# Patient Record
Sex: FEMALE | Race: WHITE | NOT HISPANIC OR LATINO | Employment: UNEMPLOYED | URBAN - METROPOLITAN AREA
[De-identification: names, ages, dates, MRNs, and addresses within clinical notes are randomized per-mention and may not be internally consistent; named-entity substitution may affect disease eponyms.]

---

## 2017-09-19 ENCOUNTER — ALLSCRIPTS OFFICE VISIT (OUTPATIENT)
Dept: OTHER | Facility: OTHER | Age: 37
End: 2017-09-19

## 2017-09-19 DIAGNOSIS — M25.50 PAIN IN JOINT: ICD-10-CM

## 2017-09-19 DIAGNOSIS — R53.81 OTHER MALAISE: ICD-10-CM

## 2017-09-19 DIAGNOSIS — Z13.6 ENCOUNTER FOR SCREENING FOR CARDIOVASCULAR DISORDERS: ICD-10-CM

## 2017-09-19 DIAGNOSIS — R53.83 OTHER FATIGUE: ICD-10-CM

## 2017-09-28 ENCOUNTER — LAB CONVERSION - ENCOUNTER (OUTPATIENT)
Dept: OTHER | Facility: OTHER | Age: 37
End: 2017-09-28

## 2017-09-28 ENCOUNTER — GENERIC CONVERSION - ENCOUNTER (OUTPATIENT)
Dept: OTHER | Facility: OTHER | Age: 37
End: 2017-09-28

## 2017-09-28 LAB
A/G RATIO (HISTORICAL): 1.8 (CALC) (ref 1–2.5)
ALBUMIN SERPL BCP-MCNC: 4.7 G/DL (ref 3.6–5.1)
ALP SERPL-CCNC: 39 U/L (ref 33–115)
ALT SERPL W P-5'-P-CCNC: 13 U/L (ref 6–29)
AST SERPL W P-5'-P-CCNC: 14 U/L (ref 10–30)
BASOPHILS # BLD AUTO: 0.8 %
BASOPHILS # BLD AUTO: 50 CELLS/UL (ref 0–200)
BILIRUB SERPL-MCNC: 0.6 MG/DL (ref 0.2–1.2)
BUN SERPL-MCNC: 10 MG/DL (ref 7–25)
BUN/CREA RATIO (HISTORICAL): NORMAL (CALC) (ref 6–22)
CALCIUM SERPL-MCNC: 9.6 MG/DL (ref 8.6–10.2)
CHLORIDE SERPL-SCNC: 106 MMOL/L (ref 98–110)
CHOLEST SERPL-MCNC: 213 MG/DL
CHOLEST/HDLC SERPL: 3.9 (CALC)
CO2 SERPL-SCNC: 29 MMOL/L (ref 20–31)
CREAT SERPL-MCNC: 0.85 MG/DL (ref 0.5–1.1)
DEPRECATED RDW RBC AUTO: 12.6 % (ref 11–15)
EGFR AFRICAN AMERICAN (HISTORICAL): 101 ML/MIN/1.73M2
EGFR-AMERICAN CALC (HISTORICAL): 88 ML/MIN/1.73M2
EOSINOPHIL # BLD AUTO: 233 CELLS/UL (ref 15–500)
EOSINOPHIL # BLD AUTO: 3.7 %
ERYTHROCYTE SEDIMENTATION RATE (HISTORICAL): 6 MM/H
GAMMA GLOBULIN (HISTORICAL): 2.6 G/DL (CALC) (ref 1.9–3.7)
GLUCOSE (HISTORICAL): 86 MG/DL (ref 65–99)
HCT VFR BLD AUTO: 41.9 % (ref 35–45)
HDLC SERPL-MCNC: 55 MG/DL
HGB BLD-MCNC: 14.2 G/DL (ref 11.7–15.5)
LDL CHOLESTEROL (HISTORICAL): 132 MG/DL (CALC)
LYME IGG/IGM AB (HISTORICAL): <0.9 INDEX
LYMPHOCYTES # BLD AUTO: 1852 CELLS/UL (ref 850–3900)
LYMPHOCYTES # BLD AUTO: 29.4 %
MCH RBC QN AUTO: 31.8 PG (ref 27–33)
MCHC RBC AUTO-ENTMCNC: 33.9 G/DL (ref 32–36)
MCV RBC AUTO: 93.7 FL (ref 80–100)
MONOCYTES # BLD AUTO: 365 CELLS/UL (ref 200–950)
MONOCYTES (HISTORICAL): 5.8 %
NEUTROPHILS # BLD AUTO: 3799 CELLS/UL (ref 1500–7800)
NEUTROPHILS # BLD AUTO: 60.3 %
NON-HDL-CHOL (CHOL-HDL) (HISTORICAL): 158 MG/DL (CALC)
PLATELET # BLD AUTO: 358 THOUSAND/UL (ref 140–400)
PMV BLD AUTO: 9.3 FL (ref 7.5–12.5)
POTASSIUM SERPL-SCNC: 4.5 MMOL/L (ref 3.5–5.3)
RBC # BLD AUTO: 4.47 MILLION/UL (ref 3.8–5.1)
SODIUM SERPL-SCNC: 141 MMOL/L (ref 135–146)
TOTAL PROTEIN (HISTORICAL): 7.3 G/DL (ref 6.1–8.1)
TRIGL SERPL-MCNC: 143 MG/DL
TSH SERPL DL<=0.05 MIU/L-ACNC: 1 MIU/L
WBC # BLD AUTO: 6.3 THOUSAND/UL (ref 3.8–10.8)

## 2018-01-17 NOTE — PROGRESS NOTES
Assessment    1  Encounter for preventive health examination (V70 0) (Z00 00)   2  Arthralgia of multiple joints (719 49) (M25 50)   3  Malaise and fatigue (780 79) (R53 81,R53 83)    Plan  Arthralgia of multiple joints, Malaise and fatigue    · (1) CBC/PLT/DIFF; Status:Active; Requested for:[id number];    · (1) COMPREHENSIVE METABOLIC PANEL; Status:Active; Requested for:[id number];    · (1) LYME ANTIBODY PROFILE W/REFLEX TO WESTERN BLOT; Status:Active; Requested for:[id number];    · (1) SED RATE; Status:Active; Requested for:[id number];    · (1) TSH; Status:Active; Requested ITE:[id number]; Health Maintenance    · Fluzone Quadrivalent 0 5 ML Intramuscular Suspension Prefilled Syringe  Screening for cardiovascular condition    · (1) LIPID PANEL, FASTING; Status:Active; Requested for:[id number];     Discussion/Summary  health maintenance visit Currently, she eats a healthy diet  cervical cancer screening is needed every year Breast cancer screening: breast cancer screening is not indicated  Colorectal cancer screening: colorectal cancer screening is not indicated  The risks and benefits of immunizations were discussed  Advice and education were given regarding weight bearing exercise and cardiovascular risk reduction  Patient discussion: discussed with the patient  History of Present Illness  , Adult Female: The patient is being seen for a health maintenance evaluation  General Health: The patient's health since the last visit is described as good  She has regular dental visits  She denies vision problems  She denies hearing loss  Immunizations status: up to date  Lifestyle:  She consumes a diverse and healthy diet  She does not have any weight concerns  She exercises regularly  She does not use tobacco  She denies alcohol use  She denies drug use  Reproductive health: the patient is premenopausal   she reports normal menses  Screening: cancer screening reviewed and current     metabolic screening reviewed and current  risk screening reviewed and current  Review of Systems    Constitutional: feeling tired  Eyes: No complaints of eye pain, no red eyes, no eyesight problems, no discharge, no dry eyes, no itching of eyes  ENT: no complaints of earache, no loss of hearing, no nose bleeds, no nasal discharge, no sore throat, no hoarseness  Cardiovascular: No complaints of slow heart rate, no fast heart rate, no chest pain, no palpitations, no leg claudication, no lower extremity edema  Respiratory: No complaints of shortness of breath, no wheezing, no cough, no SOB on exertion, no orthopnea, no PND  Gastrointestinal: No complaints of abdominal pain, no constipation, no nausea or vomiting, no diarrhea, no bloody stools  Genitourinary: No complaints of dysuria, no incontinence, no pelvic pain, no dysmenorrhea, no vaginal discharge or bleeding  Musculoskeletal: arthralgias  Integumentary: No complaints of skin rash or lesions, no itching, no skin wounds, no breast pain or lump  Neurological: No complaints of headache, no confusion, no convulsions, no numbness, no dizziness or fainting, no tingling, no limb weakness, no difficulty walking  Psychiatric: Not suicidal, no sleep disturbance, no anxiety or depression, no change in personality, no emotional problems  Endocrine: No complaints of proptosis, no hot flashes, no muscle weakness, no deepening of the voice, no feelings of weakness  Hematologic/Lymphatic: No complaints of swollen glands, no swollen glands in the neck, does not bleed easily, does not bruise easily        Family History  Mother    · Denied: Family history of Asthma, acute   · Denied: Family history of epilepsy   · Denied: Family history of kidney disease   · Denied: Family history of mental disorder   · Denied: Family history of osteoporosis   · Denied: Family history of thyroid disease  Father    · Family history of diabetes mellitus (V18 0) (Z83 3)   · Family history of hyperlipidemia (V18 19) (Z83 49)  Grandmother    · Family history of malignant neoplasm (V16 9) (Z80 9)   · Family history of substance abuse (V17 0) (Z81 4)  Uncle    · Family history of cardiac disorder (V17 49) (Z82 49)   · Family history of cerebrovascular accident (CVA) (V17 1) (Z82 3)    Social History    · Always uses seat belt   · Caffeine use (V49 89) (F15 90)   · Exercises sporadically   · Former smoker (E67 04) (Z87 891)   · [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   · Lives with family   · No illicit drug use   · Social alcohol use (Z78 9)    Current Meds   1  [facility] Vitamin D3 1000 UNIT CAPS; Therapy: (Recorded:19Sep2017) to Recorded   2  Vitamin B-12 500 MCG Oral Tablet; Therapy: (Recorded:19Sep2017) to Recorded   3  Vitamin C TABS; Therapy: (Recorded:19Sep2017) to Recorded    Allergies    1  Penicillins    Vitals   Recorded: 19Sep2017 10:39AM   Heart Rate 67   Respiration 15   Systolic 181   Diastolic 74   Height 5 ft 3 in   Weight 139 lb 0 6 oz   BMI Calculated 24 63   BSA Calculated 1 66   O2 Saturation 98     Physical Exam    Constitutional   General appearance: No acute distress, well appearing and well nourished  Eyes   Conjunctiva and lids: No swelling, erythema or discharge  Pupils and irises: Equal, round, reactive to light  Ophthalmoscopic examination: Normal fundi and optic discs  Ears, Nose, Mouth, and Throat   External inspection of ears and nose: Normal     Otoscopic examination: Tympanic membranes translucent with normal light reflex  Canals patent without erythema  Hearing: Normal     Nasal mucosa, septum, and turbinates: Normal without edema or erythema  Lips, teeth, and gums: Normal, good dentition  Oropharynx: Normal with no erythema, edema, exudate or lesions  Neck   Neck: Supple, symmetric, trachea midline, no masses  Thyroid: Normal, no thyromegaly  Pulmonary   Respiratory effort: No increased work of breathing or signs of respiratory distress      Percussion of chest: Normal     Palpation of chest: Normal     Auscultation of lungs: Clear to auscultation  Cardiovascular   Palpation of heart: Normal PMI, no thrills  Auscultation of heart: Normal rate and rhythm, normal S1 and S2, no murmurs  Carotid pulses: 2+ bilaterally  Abdominal aorta: Normal     Femoral pulses: 2+ bilaterally  Pedal pulses: 2+ bilaterally  Examination of extremities for edema and/or varicosities: Normal     Chest   Breasts: Normal, no dimpling or skin changes appreciated  Palpation of breasts and axillae: Normal, no masses palpated  Abdomen   Abdomen: Non-tender, no masses  Liver and spleen: No hepatomegaly or splenomegaly  Examination for hernias: No hernia appreciated  Lymphatic   Palpation of lymph nodes in neck: No lymphadenopathy  Palpation of lymph nodes in axillae: No lymphadenopathy  Palpation of lymph nodes in groin: No lymphadenopathy  Palpation of lymph nodes in other areas: No lymphadenopathy  Musculoskeletal   Gait and station: Normal     Digits and nails: Normal without clubbing or cyanosis  Joints, bones, and muscles: Normal     Range of motion: Normal     Stability: Normal     Muscle strength/tone: Normal     Skin   Skin and subcutaneous tissue: Normal without rashes or lesions  Palpation of skin and subcutaneous tissue: Normal turgor  Neurologic   Cranial nerves: Cranial nerves II-XII intact  Reflexes: 2+ and symmetric  Sensation: No sensory loss  Psychiatric   Judgment and insight: Normal     Orientation to person, place, and time: Normal     Recent and remote memory: Intact  Mood and affect: Normal        Results/Data  PHQ-9 Adult Depression Screening [id number] 10:51AM User, s     Test Name Result Flag Reference   PHQ-9 Adult Depression Score 3     Over the last two weeks, how often have you been bothered by any of the following problems?   Little interest or pleasure in doing things: Not at all - 0  Feeling down, depressed, or hopeless: Not at all - 0  Trouble falling or staying asleep, or sleeping too much: Not at all - 0  Feeling tired or having little energy: More than half the days - 2  Poor appetite or over eating: Not at all - 0  Feeling bad about yourself - or that you are a failure or have let yourself or your family down: Not at all - 0  Trouble concentrating on things, such as reading the newspaper or watching television: Several days - 1  Moving or speaking so slowly that other people could have noticed   Or the opposite -  being so fidgety or restless that you have been moving around a lot more than usual: Not at all - 0  Thoughts that you would be better off dead, or of hurting yourself in some way: Not at all - 0   PHQ-9 Adult Depression Screening Negative     PHQ-9 Difficulty Level Not difficult at all     PHQ-9 Severity Minimal Depression         Signatures   Electronically signed by : Hernando Reese DO; Sep 19 2017 11:12AM EST                       (Author)

## 2018-01-22 VITALS
DIASTOLIC BLOOD PRESSURE: 74 MMHG | OXYGEN SATURATION: 98 % | SYSTOLIC BLOOD PRESSURE: 118 MMHG | HEART RATE: 67 BPM | RESPIRATION RATE: 15 BRPM | BODY MASS INDEX: 24.64 KG/M2 | HEIGHT: 63 IN | WEIGHT: 139.04 LBS

## 2020-02-04 ENCOUNTER — OFFICE VISIT (OUTPATIENT)
Dept: URGENT CARE | Facility: CLINIC | Age: 40
End: 2020-02-04
Payer: COMMERCIAL

## 2020-02-04 VITALS
SYSTOLIC BLOOD PRESSURE: 106 MMHG | HEART RATE: 97 BPM | RESPIRATION RATE: 16 BRPM | TEMPERATURE: 102.2 F | WEIGHT: 132 LBS | BODY MASS INDEX: 23.38 KG/M2 | OXYGEN SATURATION: 100 % | DIASTOLIC BLOOD PRESSURE: 64 MMHG

## 2020-02-04 DIAGNOSIS — J02.9 SORE THROAT: Primary | ICD-10-CM

## 2020-02-04 LAB — S PYO AG THROAT QL: NEGATIVE

## 2020-02-04 PROCEDURE — 87880 STREP A ASSAY W/OPTIC: CPT | Performed by: PHYSICIAN ASSISTANT

## 2020-02-04 PROCEDURE — 87070 CULTURE OTHR SPECIMN AEROBIC: CPT | Performed by: PHYSICIAN ASSISTANT

## 2020-02-04 PROCEDURE — 99213 OFFICE O/P EST LOW 20 MIN: CPT | Performed by: PHYSICIAN ASSISTANT

## 2020-02-04 RX ORDER — AZITHROMYCIN 250 MG/1
TABLET, FILM COATED ORAL
Qty: 6 TABLET | Refills: 0 | Status: SHIPPED | OUTPATIENT
Start: 2020-02-04 | End: 2020-02-08

## 2020-02-04 RX ORDER — CHOLECALCIFEROL (VITAMIN D3) 125 MCG
CAPSULE ORAL
COMMUNITY

## 2020-02-04 NOTE — PATIENT INSTRUCTIONS
Acute Pharyngitis:   -Rapid strep was negative, will send out for culture  Will treat empirically with Azithromycin 250mg taken as prescribed as there is tonsillar exudates, cervical adenopathy and tonsillar erythema/edema  Take with food and a probiotic    -Warm salt water gargles and tea with honey may provide relief  Stay well hydrated and rest    -You can take Cepacol throat drops or spray for local pain relief of the throat   You can take Advil or Tylenol for fever or pain    -If your symptoms worsen or change follow up with your PCP for a recheck

## 2020-02-04 NOTE — PROGRESS NOTES
[facility]        NAME: Erum Child is a 36 y o  female  : 1980    MRN: [id number]  DATE: 2020  TIME: 3:17 PM    Assessment and Plan   Sore throat [J02 9]  1  Sore throat  POCT rapid strepA    Throat culture    azithromycin (ZITHROMAX) 250 mg tablet         Patient Instructions   Acute Pharyngitis:   -Rapid strep was negative, will send out for culture  Will treat empirically with Azithromycin 250mg taken as prescribed as there is tonsillar exudates, cervical adenopathy and tonsillar erythema/edema  Take with food and a probiotic    -Warm salt water gargles and tea with honey may provide relief  Stay well hydrated and rest    -You can take Cepacol throat drops or spray for local pain relief of the throat  You can take Advil or Tylenol for fever or pain    -If your symptoms worsen or change follow up with your PCP for a recheck     Follow up with PCP in 3-5 days  Proceed to  ER if symptoms worsen  Chief Complaint     Chief Complaint   Patient presents with    Cold Like Symptoms     pt presnets with chills, bodyaches, right sided throat pain, right ear pain; started yesterday         History of Present Illness       The patient presents today for a one day hx of fever, chills, myalgias, pharyngitis and right sided otalgia  The patient states that she has painful swallowing and anorexia  She denies sick contacts or recent travel  She denies drooling, neck pain  She denies dizziness or weakness  No hearing loss or otorrhea  She denies recent travel  No OTC measures attempted  Review of Systems   Review of Systems   Constitutional: Positive for chills and fever  Negative for activity change, appetite change, diaphoresis and fatigue  HENT: Positive for ear pain, sore throat and trouble swallowing  Negative for congestion, ear discharge, facial swelling, hearing loss, postnasal drip, rhinorrhea, sinus pressure, sinus pain, sneezing, tinnitus and voice change  Respiratory: Negative for cough, chest tightness, shortness of breath, wheezing and stridor  Cardiovascular: Negative for chest pain, palpitations and leg swelling  Gastrointestinal: Negative for abdominal pain, diarrhea, nausea and vomiting  Musculoskeletal: Negative for arthralgias, joint swelling, myalgias, neck pain and neck stiffness  Skin: Negative for rash  Allergic/Immunologic: Negative for immunocompromised state  Neurological: Negative for dizziness, weakness, light-headedness, numbness and headaches  Hematological: Negative for adenopathy  Current Medications       Current Outpatient Medications:     Cholecalciferol 25 MCG (1000 UT) capsule, Take by mouth, Disp: , Rfl:     vitamin B-12 (VITAMIN B-12) 500 mcg tablet, Take by mouth, Disp: , Rfl:     azithromycin (ZITHROMAX) 250 mg tablet, Take 2 tablets today then 1 tablet daily x 4 days, Disp: 6 tablet, Rfl: 0    Current Allergies     Allergies as of 02/04/2020 - Reviewed 02/04/2020   Allergen Reaction Noted    Penicillins Hives and Rash 04/15/2016            The following portions of the patient's history were reviewed and updated as appropriate: allergies, current medications, past family history, past medical history, past social history, past surgical history and problem list      Past Medical History:   Diagnosis Date    Patient denies medical problems        Past Surgical History:   Procedure Laterality Date    NO PAST SURGERIES         Family History   Problem Relation Age of Onset    No Known Problems Mother     Diabetes Father     Hypertension Father     Hyperlipidemia Father          Medications have been verified  Objective   /64   Pulse 97   Temp (!) 102 2 °F (39 °C)   Resp 16   Wt 59 9 kg (132 lb)   LMP 01/17/2020   SpO2 100%   BMI 23 38 kg/m²        Physical Exam     Physical Exam   Constitutional: She is oriented to person, place, and time  She appears well-developed and well-nourished  Non-toxic appearance  She does not have a sickly appearance  She does not appear ill  No distress  HENT:   Head: Normocephalic and atraumatic  Right Ear: Hearing, tympanic membrane, external ear and ear canal normal    Left Ear: Hearing, tympanic membrane, external ear and ear canal normal    Nose: Nose normal  No mucosal edema or rhinorrhea  Right sinus exhibits no maxillary sinus tenderness and no frontal sinus tenderness  Left sinus exhibits no maxillary sinus tenderness and no frontal sinus tenderness  Mouth/Throat: Uvula is midline and mucous membranes are normal  No uvula swelling  Posterior oropharyngeal edema and posterior oropharyngeal erythema present  No oropharyngeal exudate or tonsillar abscesses  Tonsils are 2+ on the right  Tonsils are 2+ on the left  Tonsillar exudate  Neck: Normal range of motion  Neck supple  Cardiovascular: Normal rate, regular rhythm, S1 normal and S2 normal  Exam reveals no gallop, no S3, no S4, no distant heart sounds and no friction rub  No murmur heard  Pulmonary/Chest: No accessory muscle usage  No tachypnea and no bradypnea  No respiratory distress  She has no decreased breath sounds  She has no wheezes  She has no rhonchi  She has no rales  Lymphadenopathy:     She has cervical adenopathy  Neurological: She is alert and oriented to person, place, and time  Skin: She is not diaphoretic  Psychiatric: She has a normal mood and affect  Her behavior is normal    Nursing note and vitals reviewed

## 2020-02-06 LAB — BACTERIA THROAT CULT: NORMAL

## 2021-02-04 ENCOUNTER — OFFICE VISIT (OUTPATIENT)
Dept: FAMILY MEDICINE CLINIC | Facility: CLINIC | Age: 41
End: 2021-02-04
Payer: COMMERCIAL

## 2021-02-04 VITALS
WEIGHT: 135 LBS | HEART RATE: 72 BPM | OXYGEN SATURATION: 99 % | DIASTOLIC BLOOD PRESSURE: 80 MMHG | BODY MASS INDEX: 24.84 KG/M2 | SYSTOLIC BLOOD PRESSURE: 138 MMHG | TEMPERATURE: 98.4 F | RESPIRATION RATE: 16 BRPM | HEIGHT: 62 IN

## 2021-02-04 DIAGNOSIS — Z76.89 ESTABLISHING CARE WITH NEW DOCTOR, ENCOUNTER FOR: Primary | ICD-10-CM

## 2021-02-04 DIAGNOSIS — R53.83 FATIGUE, UNSPECIFIED TYPE: ICD-10-CM

## 2021-02-04 DIAGNOSIS — R59.0 SUPRACLAVICULAR LYMPHADENOPATHY: ICD-10-CM

## 2021-02-04 PROBLEM — M25.50 ARTHRALGIA OF MULTIPLE JOINTS: Status: ACTIVE | Noted: 2017-09-19

## 2021-02-04 PROCEDURE — 99203 OFFICE O/P NEW LOW 30 MIN: CPT | Performed by: FAMILY MEDICINE

## 2021-02-04 NOTE — PATIENT INSTRUCTIONS
HYDRATION  REST  BW WILL BE OBTAINED  CT SOFT TISSUE NECK    SCHEDULE CPX IN NEAR FUTURE    FURTHER PLANS PENDING STUDIES

## 2021-02-05 NOTE — PROGRESS NOTES
Assessment/Plan:    No problem-specific Assessment & Plan notes found for this encounter  Diagnoses and all orders for this visit:    Establishing care with new doctor, encounter for    Supraclavicular lymphadenopathy  -     CBC and differential; Future  -     Sedimentation rate, automated; Future  -     C-reactive protein; Future  -     CT soft tissue neck w contrast; Future  -     CBC and differential  -     Sedimentation rate, automated  -     C-reactive protein    Fatigue, unspecified type  -     CBC and differential; Future  -     Comprehensive metabolic panel; Future  -     Lipid panel; Future  -     TSH, 3rd generation; Future  -     EBV acute panel; Future  -     Lyme Total Antibody Profile with reflex to WB; Future  -     Sedimentation rate, automated; Future  -     C-reactive protein; Future  -     CT soft tissue neck w contrast; Future  -     CBC and differential  -     Comprehensive metabolic panel  -     Lipid panel  -     TSH, 3rd generation  -     EBV acute panel  -     Lyme Total Antibody Profile with reflex to WB  -     Sedimentation rate, automated  -     C-reactive protein          Patient Instructions   HYDRATION  REST  BW WILL BE OBTAINED  CT SOFT TISSUE NECK    SCHEDULE CPX IN NEAR FUTURE    FURTHER PLANS PENDING STUDIES        Return in about 2 years (around 2/4/2023) for Annual physical     Subjective:      Patient ID: Kapil Faith is a 39 y o  female      Chief Complaint   Patient presents with   Abiodun ON LEFT SIDE OF NECK       PATIENT IS A NEW PATIENT TO THE PRACTICE    REVIEWED PAST MEDICAL HISTORY, MEDS, ALLERGIES AND FHX  QUIT SMOKING 4 YRS AGO  1 ETOH WEEKLY    CONCERNS    FATIGUE  NOT HERSELF  NOT MOTIVATED  TIRED A LOT  DENIES ANY JOINT PAIN OR SWELLING  NO RASHES    L NECK SWELLING  TENDER AT TIMES  CLAVICAL DOES NOT FEEL RIGHT      The following portions of the patient's history were reviewed and updated as appropriate: allergies, current medications, past family history, past medical history, past social history, past surgical history and problem list     Review of Systems   Constitutional: Positive for fatigue  Negative for chills and fever  HENT: Negative for congestion, ear discharge, ear pain, mouth sores, postnasal drip, sore throat and trouble swallowing  Eyes: Negative for pain, discharge and visual disturbance  Respiratory: Negative for cough, shortness of breath and wheezing  Cardiovascular: Negative for chest pain, palpitations and leg swelling  Gastrointestinal: Negative for abdominal distention, abdominal pain, blood in stool, diarrhea and nausea  Endocrine: Negative for polydipsia, polyphagia and polyuria  Genitourinary: Negative for dysuria, frequency, hematuria and urgency  Musculoskeletal: Negative for arthralgias, gait problem and joint swelling  Skin: Negative for pallor and rash  Neurological: Negative for dizziness, syncope, speech difficulty, weakness, light-headedness, numbness and headaches  Hematological: Negative for adenopathy  Psychiatric/Behavioral: Negative for behavioral problems, confusion and sleep disturbance  The patient is nervous/anxious  Current Outpatient Medications   Medication Sig Dispense Refill    Cholecalciferol 25 MCG (1000 UT) capsule Take by mouth      vitamin B-12 (VITAMIN B-12) 500 mcg tablet Take by mouth       No current facility-administered medications for this visit  Objective:    /80   Pulse 72   Temp 98 4 °F (36 9 °C) (Temporal)   Resp 16   Ht 5' 2 25" (1 581 m)   Wt 61 2 kg (135 lb)   SpO2 99%   BMI 24 49 kg/m²        Physical Exam  Constitutional:       Appearance: She is well-developed  HENT:      Head: Normocephalic and atraumatic  Eyes:      General:         Right eye: No discharge  Left eye: No discharge  Conjunctiva/sclera: Conjunctivae normal       Pupils: Pupils are equal, round, and reactive to light     Neck: Musculoskeletal: Normal range of motion and neck supple  Thyroid: No thyromegaly  Comments: MILD L SUPRACLAVICULAR FULLNESS    Cardiovascular:      Rate and Rhythm: Normal rate and regular rhythm  Heart sounds: Normal heart sounds  No murmur  Pulmonary:      Effort: Pulmonary effort is normal  No respiratory distress  Breath sounds: Normal breath sounds  No wheezing or rales  Abdominal:      General: Bowel sounds are normal       Palpations: Abdomen is soft  Tenderness: There is no abdominal tenderness  Musculoskeletal: Normal range of motion  General: No tenderness  Lymphadenopathy:      Cervical: Cervical adenopathy present  Skin:     General: Skin is warm and dry  Findings: No erythema or rash  Neurological:      Mental Status: She is alert and oriented to person, place, and time  Psychiatric:         Behavior: Behavior normal          Thought Content:  Thought content normal          Judgment: Judgment normal                 Veronique Whipple MD

## 2021-02-09 ENCOUNTER — TELEPHONE (OUTPATIENT)
Dept: FAMILY MEDICINE CLINIC | Facility: CLINIC | Age: 41
End: 2021-02-09

## 2021-02-09 NOTE — TELEPHONE ENCOUNTER
Dez two message to inform her that her insurance approved her CT Scan    LM with central scheduling telephone number

## 2021-02-15 ENCOUNTER — TRANSCRIBE ORDERS (OUTPATIENT)
Dept: ADMINISTRATIVE | Facility: HOSPITAL | Age: 41
End: 2021-02-15

## 2021-03-31 ENCOUNTER — TELEPHONE (OUTPATIENT)
Dept: FAMILY MEDICINE CLINIC | Facility: CLINIC | Age: 41
End: 2021-03-31

## 2021-04-26 ENCOUNTER — TRANSCRIBE ORDERS (OUTPATIENT)
Dept: ADMINISTRATIVE | Facility: HOSPITAL | Age: 41
End: 2021-04-26

## 2021-04-27 ENCOUNTER — HOSPITAL ENCOUNTER (OUTPATIENT)
Dept: RADIOLOGY | Facility: HOSPITAL | Age: 41
Discharge: HOME | End: 2021-04-27
Payer: COMMERCIAL

## 2021-04-27 DIAGNOSIS — R59.0 SUPRACLAVICULAR LYMPHADENOPATHY: ICD-10-CM

## 2021-04-27 DIAGNOSIS — R53.83 FATIGUE, UNSPECIFIED TYPE: ICD-10-CM

## 2021-04-27 PROCEDURE — 70491 CT SOFT TISSUE NECK W/DYE: CPT

## 2021-04-27 PROCEDURE — G1004 CDSM NDSC: HCPCS

## 2021-04-27 RX ADMIN — IOHEXOL 85 ML: 350 INJECTION, SOLUTION INTRAVENOUS at 17:17

## 2021-05-04 LAB
ALBUMIN SERPL-MCNC: 4.6 G/DL (ref 3.8–4.8)
ALBUMIN/GLOB SERPL: 1.8 {RATIO} (ref 1.2–2.2)
ALP SERPL-CCNC: 34 IU/L (ref 39–117)
ALT SERPL-CCNC: 26 IU/L (ref 0–32)
AST SERPL-CCNC: 22 IU/L (ref 0–40)
BASOPHILS # BLD AUTO: 0.1 X10E3/UL (ref 0–0.2)
BASOPHILS NFR BLD AUTO: 1 %
BILIRUB SERPL-MCNC: 0.3 MG/DL (ref 0–1.2)
BUN SERPL-MCNC: 10 MG/DL (ref 6–24)
BUN/CREAT SERPL: 13 (ref 9–23)
CALCIUM SERPL-MCNC: 9.7 MG/DL (ref 8.7–10.2)
CHLORIDE SERPL-SCNC: 101 MMOL/L (ref 96–106)
CHOLEST SERPL-MCNC: 230 MG/DL (ref 100–199)
CHOLEST/HDLC SERPL: 3.7 RATIO (ref 0–4.4)
CO2 SERPL-SCNC: 27 MMOL/L (ref 20–29)
CREAT SERPL-MCNC: 0.77 MG/DL (ref 0.57–1)
EOSINOPHIL # BLD AUTO: 0.1 X10E3/UL (ref 0–0.4)
EOSINOPHIL NFR BLD AUTO: 1 %
ERYTHROCYTE [DISTWIDTH] IN BLOOD BY AUTOMATED COUNT: 12.3 % (ref 11.7–15.4)
ERYTHROCYTE [SEDIMENTATION RATE] IN BLOOD BY WESTERGREN METHOD: 4 MM/HR (ref 0–32)
GLOBULIN SER-MCNC: 2.5 G/DL (ref 1.5–4.5)
GLUCOSE SERPL-MCNC: 92 MG/DL (ref 65–99)
HCT VFR BLD AUTO: 41.1 % (ref 34–46.6)
HDLC SERPL-MCNC: 63 MG/DL
HGB BLD-MCNC: 13.6 G/DL (ref 11.1–15.9)
IMM GRANULOCYTES # BLD: 0 X10E3/UL (ref 0–0.1)
IMM GRANULOCYTES NFR BLD: 0 %
LDLC SERPL CALC-MCNC: 146 MG/DL (ref 0–99)
LYMPHOCYTES # BLD AUTO: 1.6 X10E3/UL (ref 0.7–3.1)
LYMPHOCYTES NFR BLD AUTO: 21 %
MCH RBC QN AUTO: 31.6 PG (ref 26.6–33)
MCHC RBC AUTO-ENTMCNC: 33.1 G/DL (ref 31.5–35.7)
MCV RBC AUTO: 95 FL (ref 79–97)
MONOCYTES # BLD AUTO: 0.5 X10E3/UL (ref 0.1–0.9)
MONOCYTES NFR BLD AUTO: 6 %
NEUTROPHILS # BLD AUTO: 5.3 X10E3/UL (ref 1.4–7)
NEUTROPHILS NFR BLD AUTO: 71 %
PLATELET # BLD AUTO: 334 X10E3/UL (ref 150–450)
POTASSIUM SERPL-SCNC: 4.3 MMOL/L (ref 3.5–5.2)
PROT SERPL-MCNC: 7.1 G/DL (ref 6–8.5)
RBC # BLD AUTO: 4.31 X10E6/UL (ref 3.77–5.28)
SL AMB EGFR AFRICAN AMERICAN: 111 ML/MIN/1.73
SL AMB EGFR NON AFRICAN AMERICAN: 96 ML/MIN/1.73
SL AMB VLDL CHOLESTEROL CALC: 21 MG/DL (ref 5–40)
SODIUM SERPL-SCNC: 139 MMOL/L (ref 134–144)
TRIGL SERPL-MCNC: 118 MG/DL (ref 0–149)
TSH SERPL DL<=0.005 MIU/L-ACNC: 1.83 UIU/ML (ref 0.45–4.5)
VIT B12 SERPL-MCNC: 490 PG/ML (ref 232–1245)
WBC # BLD AUTO: 7.6 X10E3/UL (ref 3.4–10.8)

## 2021-05-05 LAB
25(OH)D3+25(OH)D2 SERPL-MCNC: 19 NG/ML (ref 30–100)
B BURGDOR IGG+IGM SER-ACNC: <0.91 ISR (ref 0–0.9)
CRP SERPL-MCNC: 1 MG/L (ref 0–10)
EBV NA IGG SER IA-ACNC: <18 U/ML (ref 0–17.9)
EBV VCA IGG SER IA-ACNC: 186 U/ML (ref 0–17.9)
EBV VCA IGM SER IA-ACNC: <36 U/ML (ref 0–35.9)
INTERPRETATION: ABNORMAL

## 2021-05-10 ENCOUNTER — OFFICE VISIT (OUTPATIENT)
Dept: FAMILY MEDICINE CLINIC | Facility: CLINIC | Age: 41
End: 2021-05-10
Payer: COMMERCIAL

## 2021-05-10 VITALS
SYSTOLIC BLOOD PRESSURE: 122 MMHG | RESPIRATION RATE: 16 BRPM | WEIGHT: 137 LBS | TEMPERATURE: 97.7 F | BODY MASS INDEX: 25.21 KG/M2 | HEART RATE: 68 BPM | OXYGEN SATURATION: 98 % | HEIGHT: 62 IN | DIASTOLIC BLOOD PRESSURE: 78 MMHG

## 2021-05-10 DIAGNOSIS — Z00.00 ROUTINE GENERAL MEDICAL EXAMINATION AT A HEALTH CARE FACILITY: Primary | ICD-10-CM

## 2021-05-10 DIAGNOSIS — Z13.29 SCREENING FOR HYPOTHYROIDISM: ICD-10-CM

## 2021-05-10 DIAGNOSIS — Z12.11 SCREENING FOR COLON CANCER: ICD-10-CM

## 2021-05-10 DIAGNOSIS — Z12.31 ENCOUNTER FOR SCREENING MAMMOGRAM FOR BREAST CANCER: ICD-10-CM

## 2021-05-10 DIAGNOSIS — M25.50 ARTHRALGIA OF MULTIPLE JOINTS: ICD-10-CM

## 2021-05-10 DIAGNOSIS — N92.6 IRREGULAR PERIODS/MENSTRUAL CYCLES: ICD-10-CM

## 2021-05-10 DIAGNOSIS — Z13.6 SCREENING FOR HYPERTENSION: ICD-10-CM

## 2021-05-10 DIAGNOSIS — Z13.220 SCREENING FOR HYPERLIPIDEMIA: ICD-10-CM

## 2021-05-10 DIAGNOSIS — R53.83 FATIGUE, UNSPECIFIED TYPE: ICD-10-CM

## 2021-05-10 DIAGNOSIS — Z12.31 ENCOUNTER FOR SCREENING MAMMOGRAM FOR MALIGNANT NEOPLASM OF BREAST: ICD-10-CM

## 2021-05-10 DIAGNOSIS — R59.0 SUPRACLAVICULAR LYMPHADENOPATHY: ICD-10-CM

## 2021-05-10 PROBLEM — Z76.89 ESTABLISHING CARE WITH NEW DOCTOR, ENCOUNTER FOR: Status: RESOLVED | Noted: 2021-02-04 | Resolved: 2021-05-10

## 2021-05-10 LAB — SL AMB POCT FECES OCC BLD: NORMAL

## 2021-05-10 PROCEDURE — 99396 PREV VISIT EST AGE 40-64: CPT | Performed by: FAMILY MEDICINE

## 2021-05-10 PROCEDURE — 93000 ELECTROCARDIOGRAM COMPLETE: CPT | Performed by: FAMILY MEDICINE

## 2021-05-10 PROCEDURE — 82270 OCCULT BLOOD FECES: CPT | Performed by: FAMILY MEDICINE

## 2021-05-10 NOTE — PROGRESS NOTES
BMI Counseling: Body mass index is 25 06 kg/m²  The BMI is above normal  Nutrition recommendations include encouraging healthy choices of fruits and vegetables and moderation in carbohydrate intake  Exercise recommendations include exercising 3-5 times per week  No pharmacotherapy was ordered  FAMILY PRACTICE HEALTH MAINTENANCE OFFICE VISIT  [facility] [place] [id number] PHYSICIANS    NAME: Cruz Hernandez  AGE: 39 y o  SEX: female  : 1980     DATE: 5/10/2021    Assessment and Plan     Problem List Items Addressed This Visit        Immune and Lymphatic    Supraclavicular lymphadenopathy       Other    Arthralgia of multiple joints    Fatigue    Encounter for screening mammogram for breast cancer    Screening for hypothyroidism    Screening for hyperlipidemia    Screening for hypertension    Relevant Orders    POCT ECG (Completed)    Routine general medical examination at a health care facility - Primary    Relevant Orders    Pap Lb (Liquid-based)    Encounter for screening mammogram for malignant neoplasm of breast    Relevant Orders    Mammo screening bilateral w 3d & cad    Screening for colon cancer    Relevant Orders    POCT hemoccult screening (Completed)      Other Visit Diagnoses     Irregular periods/menstrual cycles        Relevant Orders    US abdomen and pelvis with transvaginal               Return in about 1 year (around 5/10/2022) for Annual physical         Chief Complaint     Chief Complaint   Patient presents with    Annual Exam     Pt here for a physical with pap  History of Present Illness     DISCUSSED HEALTH ISSUES  REVIEWED MEDICAL RECORD  CONCERNS AT THIS TIME    FATIGUE  R ARM PAIN  IRREGULAR PERIOD        Well Adult Physical   Patient here for a comprehensive physical exam       Diet and Physical Activity  Diet: well balanced diet  Weight concerns: Patient is overweight (BMI 25 0-29  9)  Exercise: infrequently      Depression Screen  PHQ-9 Depression Screening    PHQ-9:   Frequency of the following problems over the past two weeks:      Little interest or pleasure in doing things: 0 - not at all  Feeling down, depressed, or hopeless: 0 - not at all  PHQ-2 Score: 0          General Health  Hearing: Normal:  bilateral  Vision: no vision problems  Dental: regular dental visits    Reproductive Health          The following portions of the patient's history were reviewed and updated as appropriate: allergies, current medications, past family history, past medical history, past social history, past surgical history and problem list     Review of Systems     Review of Systems   Constitutional: Positive for fatigue  Negative for chills and fever  HENT: Negative for congestion, ear discharge, ear pain, mouth sores, postnasal drip, sore throat and trouble swallowing  Eyes: Negative for pain, discharge and visual disturbance  Respiratory: Negative for cough, shortness of breath and wheezing  Cardiovascular: Negative for chest pain, palpitations and leg swelling  Gastrointestinal: Negative for abdominal distention, abdominal pain, blood in stool, diarrhea and nausea  Endocrine: Negative for polydipsia, polyphagia and polyuria  Genitourinary: Negative for dysuria, frequency, hematuria and urgency  Musculoskeletal: Positive for arthralgias  Negative for gait problem and joint swelling  Skin: Negative for pallor and rash  Neurological: Negative for dizziness, syncope, speech difficulty, weakness, light-headedness, numbness and headaches  Hematological: Negative for adenopathy  Psychiatric/Behavioral: Negative for behavioral problems, confusion and sleep disturbance  The patient is not nervous/anxious          Past Medical History     Past Medical History:   Diagnosis Date    Patient denies medical problems        Past Surgical History     Past Surgical History:   Procedure Laterality Date    NO PAST SURGERIES         Social History Social History     Socioeconomic History    Marital status: /Civil Union     Spouse name: None    Number of children: None    Years of education: None    Highest education level: None   Occupational History    None   Social Needs    Financial resource strain: None    Food insecurity     Worry: None     Inability: None    Transportation needs     Medical: None     Non-medical: None   Tobacco Use    Smoking status: Former Smoker     Types: Cigarettes     Quit date:      Years since quittin 3    Smokeless tobacco: Never Used   Substance and Sexual Activity    Alcohol use: Yes     Frequency: Monthly or less     Drinks per session: 1 or 2     Binge frequency: Never     Comment: SOCIALLY    Drug use: Never    Sexual activity: None   Lifestyle    Physical activity     Days per week: None     Minutes per session: None    Stress: None   Relationships    Social connections     Talks on phone: None     Gets together: None     Attends Baptism service: None     Active member of club or organization: None     Attends meetings of clubs or organizations: None     Relationship status: None    Intimate partner violence     Fear of current or ex partner: None     Emotionally abused: None     Physically abused: None     Forced sexual activity: None   Other Topics Concern    None   Social History Narrative    None       Family History     Family History   Problem Relation Age of Onset    No Known Problems Mother     Diabetes Father     Hypertension Father     Hyperlipidemia Father     Substance Abuse Neg Hx     Mental illness Neg Hx        Current Medications       Current Outpatient Medications:     Ascorbic Acid (VITAMIN C PO), Take by mouth daily, Disp: , Rfl:     Cholecalciferol (VITAMIN D3 PO), Take 1,000 mg by mouth daily, Disp: , Rfl:     vitamin B-12 (VITAMIN B-12) 500 mcg tablet, Take by mouth, Disp: , Rfl:      Allergies     Allergies   Allergen Reactions    Penicillins Hives and Rash       Objective     /78 (BP Location: Left arm, Patient Position: Sitting, Cuff Size: Adult)   Pulse 68   Temp 97 7 °F (36 5 °C) (Temporal)   Resp 16   Ht 5' 2" (1 575 m)   Wt 62 1 kg (137 lb)   LMP 05/04/2021 (Exact Date)   SpO2 98%   BMI 25 06 kg/m²      Physical Exam  Vitals signs reviewed  Constitutional:       Appearance: She is well-developed  HENT:      Head: Normocephalic and atraumatic  Right Ear: External ear normal       Left Ear: External ear normal       Nose: Nose normal       Mouth/Throat:      Mouth: Mucous membranes are moist       Pharynx: Oropharynx is clear  Eyes:      General:         Right eye: No discharge  Left eye: No discharge  Conjunctiva/sclera: Conjunctivae normal       Pupils: Pupils are equal, round, and reactive to light  Neck:      Musculoskeletal: Normal range of motion and neck supple  Thyroid: No thyromegaly  Cardiovascular:      Rate and Rhythm: Normal rate and regular rhythm  Heart sounds: Normal heart sounds  No murmur  Pulmonary:      Effort: Pulmonary effort is normal       Breath sounds: Normal breath sounds  No wheezing or rales  Abdominal:      General: Bowel sounds are normal  There is no distension  Palpations: Abdomen is soft  There is no mass  Tenderness: There is no abdominal tenderness  There is no guarding or rebound  Genitourinary:     General: Normal vulva  Vagina: Normal  No vaginal discharge  Rectum: Normal  Guaiac result negative  Comments: UTERUS 8-9 WEEK SIZE  Musculoskeletal: Normal range of motion  General: No tenderness or deformity  Lymphadenopathy:      Cervical: No cervical adenopathy  Skin:     General: Skin is warm and dry  Findings: No erythema or rash  Neurological:      Mental Status: She is alert and oriented to person, place, and time  Cranial Nerves: No cranial nerve deficit  Motor: No abnormal muscle tone        Coordination: Coordination normal       Deep Tendon Reflexes: Reflexes are normal and symmetric  Psychiatric:         Behavior: Behavior normal          Thought Content: Thought content normal          Judgment: Judgment normal             Visual Acuity Screening    Right eye Left eye Both eyes   Without correction: 20/20 20/20 20/20   With correction:      Comments: Pt correctly identified the colors  sp/cma      Health Maintenance     Health Maintenance   Topic Date Due    MAMMOGRAM  Never done    HIV Screening  Never done    COVID-19 Vaccine (1) Never done    BMI: Followup Plan  Never done    Annual Physical  Never done    DTaP,Tdap,and Td Vaccines (1 - Tdap) Never done    Cervical Cancer Screening  Never done    Influenza Vaccine (Season Ended) 09/01/2021    Depression Screening PHQ  02/04/2022    BMI: Adult  05/10/2022    Pneumococcal Vaccine: Pediatrics (0 to 5 Years) and At-Risk Patients (6 to 59 Years)  Aged Out    HIB Vaccine  Aged Out    Hepatitis B Vaccine  Aged Out    IPV Vaccine  Aged Out    Hepatitis A Vaccine  Aged Out    Meningococcal ACWY Vaccine  Aged Out    HPV Vaccine  Aged Out     There is no immunization history for the selected administration types on file for this patient      Staci Chaparro MD  [facility]

## 2021-05-10 NOTE — PATIENT INSTRUCTIONS
DISCUSSED HEALTH ISSUES  HEALTHY DIET AND EXERCISE  BW WILL BE OBTAINED  MAMMOGRAPHY   RECOMMEND CALCIUM 9220-3462 MG DAILY  VITAMIN D3  1000 IU DAILY  RV IN 1 YEAR FOR ANNUAL EXAM, SOONER IF NEEDED      Recent Results (from the past 2688 hour(s))   CBC and differential    Collection Time: 05/04/21  9:56 AM   Result Value Ref Range    White Blood Cell Count 7 6 3 4 - 10 8 x10E3/uL    Red Blood Cell Count 4 31 3 77 - 5 28 x10E6/uL    Hemoglobin 13 6 11 1 - 15 9 g/dL    HCT 41 1 34 0 - 46 6 %    MCV 95 79 - 97 fL    MCH 31 6 26 6 - 33 0 pg    MCHC 33 1 31 5 - 35 7 g/dL    RDW 12 3 11 7 - 15 4 %    Platelet Count 134 787 - 450 x10E3/uL    Neutrophils 71 Not Estab  %    Lymphocytes 21 Not Estab  %    Monocytes 6 Not Estab  %    Eosinophils 1 Not Estab  %    Basophils PCT 1 Not Estab  %    Neutrophils (Absolute) 5 3 1 4 - 7 0 x10E3/uL    Lymphocytes (Absolute) 1 6 0 7 - 3 1 x10E3/uL    Monocytes (Absolute) 0 5 0 1 - 0 9 x10E3/uL    Eosinophils (Absolute) 0 1 0 0 - 0 4 x10E3/uL    Basophils ABS 0 1 0 0 - 0 2 x10E3/uL    Immature Granulocytes 0 Not Estab  %    Immature Granulocytes (Absolute) 0 0 0 0 - 0 1 x10E3/uL   Comprehensive metabolic panel    Collection Time: 05/04/21  9:56 AM   Result Value Ref Range    Glucose, Random 92 65 - 99 mg/dL    BUN 10 6 - 24 mg/dL    Creatinine 0 77 0 57 - 1 00 mg/dL    eGFR Non  96 >59 mL/min/1 73    eGFR  111 >59 mL/min/1 73    SL AMB BUN/CREATININE RATIO 13 9 - 23    Sodium 139 134 - 144 mmol/L    Potassium 4 3 3 5 - 5 2 mmol/L    Chloride 101 96 - 106 mmol/L    CO2 27 20 - 29 mmol/L    CALCIUM 9 7 8 7 - 10 2 mg/dL    Protein, Total 7 1 6 0 - 8 5 g/dL    Albumin 4 6 3 8 - 4 8 g/dL    Globulin, Total 2 5 1 5 - 4 5 g/dL    Albumin/Globulin Ratio 1 8 1 2 - 2 2    TOTAL BILIRUBIN 0 3 0 0 - 1 2 mg/dL    Alk Phos Isoenzymes 34 (L) 39 - 117 IU/L    AST 22 0 - 40 IU/L    ALT 26 0 - 32 IU/L   Lipid panel    Collection Time: 05/04/21  9:56 AM   Result Value Ref Range    Cholesterol, Total 230 (H) 100 - 199 mg/dL    Triglycerides 118 0 - 149 mg/dL    HDL 63 >39 mg/dL    VLDL Cholesterol Calculated 21 5 - 40 mg/dL    LDL Calculated 146 (H) 0 - 99 mg/dL    T   Chol/HDL Ratio 3 7 0 0 - 4 4 ratio   TSH, 3rd generation    Collection Time: 05/04/21  9:56 AM   Result Value Ref Range    TSH 1 830 0 450 - 4 500 uIU/mL   EBV acute panel    Collection Time: 05/04/21  9:56 AM   Result Value Ref Range    EBV VCA IgM <36 0 0 0 - 35 9 U/mL    EBV VCA IgG 186 0 (H) 0 0 - 17 9 U/mL    EBV Nuclear Ag Ab <18 0 0 0 - 17 9 U/mL    INTERPRETATION Comment    Lyme Total Antibody Profile with reflex to WB    Collection Time: 05/04/21  9:56 AM   Result Value Ref Range    Lyme IgG/IgM Ab <0 91 0 00 - 0 90 ISR   Sedimentation rate, automated    Collection Time: 05/04/21  9:56 AM   Result Value Ref Range    Sedimentation Rate 4 0 - 32 mm/hr   C-reactive protein    Collection Time: 05/04/21  9:56 AM   Result Value Ref Range    C-Reactive Protein, Quant 1 0 - 10 mg/L   Vitamin B12    Collection Time: 05/04/21 10:00 AM   Result Value Ref Range    Vitamin B-12 490 232 - 1,245 pg/mL   Vitamin D 25 hydroxy    Collection Time: 05/04/21 10:00 AM   Result Value Ref Range    25-HYDROXY VIT D 19 0 (L) 30 0 - 100 0 ng/mL

## 2021-05-10 NOTE — LETTER
DAT [id number]      Current Outpatient Medications:     Cholecalciferol 25 MCG (1000 UT) capsule, Take by mouth, Disp: , Rfl:     vitamin B-12 (VITAMIN B-12) 500 mcg tablet, Take by mouth, Disp: , Rfl:       Recent Results (from the past 2688 hour(s))   CBC and differential    Collection Time: 05/04/21  9:56 AM   Result Value Ref Range    White Blood Cell Count 7 6 3 4 - 10 8 x10E3/uL    Red Blood Cell Count 4 31 3 77 - 5 28 x10E6/uL    Hemoglobin 13 6 11 1 - 15 9 g/dL    HCT 41 1 34 0 - 46 6 %    MCV 95 79 - 97 fL    MCH 31 6 26 6 - 33 0 pg    MCHC 33 1 31 5 - 35 7 g/dL    RDW 12 3 11 7 - 15 4 %    Platelet Count 364 491 - 450 x10E3/uL    Neutrophils 71 Not Estab  %    Lymphocytes 21 Not Estab  %    Monocytes 6 Not Estab  %    Eosinophils 1 Not Estab  %    Basophils PCT 1 Not Estab  %    Neutrophils (Absolute) 5 3 1 4 - 7 0 x10E3/uL    Lymphocytes (Absolute) 1 6 0 7 - 3 1 x10E3/uL    Monocytes (Absolute) 0 5 0 1 - 0 9 x10E3/uL    Eosinophils (Absolute) 0 1 0 0 - 0 4 x10E3/uL    Basophils ABS 0 1 0 0 - 0 2 x10E3/uL    Immature Granulocytes 0 Not Estab  %    Immature Granulocytes (Absolute) 0 0 0 0 - 0 1 x10E3/uL   Comprehensive metabolic panel    Collection Time: 05/04/21  9:56 AM   Result Value Ref Range    Glucose, Random 92 65 - 99 mg/dL    BUN 10 6 - 24 mg/dL    Creatinine 0 77 0 57 - 1 00 mg/dL    eGFR Non  96 >59 mL/min/1 73    eGFR  111 >59 mL/min/1 73    SL AMB BUN/CREATININE RATIO 13 9 - 23    Sodium 139 134 - 144 mmol/L    Potassium 4 3 3 5 - 5 2 mmol/L    Chloride 101 96 - 106 mmol/L    CO2 27 20 - 29 mmol/L    CALCIUM 9 7 8 7 - 10 2 mg/dL    Protein, Total 7 1 6 0 - 8 5 g/dL    Albumin 4 6 3 8 - 4 8 g/dL    Globulin, Total 2 5 1 5 - 4 5 g/dL    Albumin/Globulin Ratio 1 8 1 2 - 2 2    TOTAL BILIRUBIN 0 3 0 0 - 1 2 mg/dL    Alk Phos Isoenzymes 34 (L) 39 - 117 IU/L    AST 22 0 - 40 IU/L    ALT 26 0 - 32 IU/L   Lipid panel    Collection Time: 05/04/21  9:56 AM   Result Value Ref Range    Cholesterol, Total 230 (H) 100 - 199 mg/dL    Triglycerides 118 0 - 149 mg/dL    HDL 63 >39 mg/dL    VLDL Cholesterol Calculated 21 5 - 40 mg/dL    LDL Calculated 146 (H) 0 - 99 mg/dL    T   Chol/HDL Ratio 3 7 0 0 - 4 4 ratio   TSH, 3rd generation    Collection Time: 05/04/21  9:56 AM   Result Value Ref Range    TSH 1 830 0 450 - 4 500 uIU/mL   EBV acute panel    Collection Time: 05/04/21  9:56 AM   Result Value Ref Range    EBV VCA IgM <36 0 0 0 - 35 9 U/mL    EBV VCA IgG 186 0 (H) 0 0 - 17 9 U/mL    EBV Nuclear Ag Ab <18 0 0 0 - 17 9 U/mL    INTERPRETATION Comment    Lyme Total Antibody Profile with reflex to WB    Collection Time: 05/04/21  9:56 AM   Result Value Ref Range    Lyme IgG/IgM Ab <0 91 0 00 - 0 90 ISR   Sedimentation rate, automated    Collection Time: 05/04/21  9:56 AM   Result Value Ref Range    Sedimentation Rate 4 0 - 32 mm/hr   C-reactive protein    Collection Time: 05/04/21  9:56 AM   Result Value Ref Range    C-Reactive Protein, Quant 1 0 - 10 mg/L   Vitamin B12    Collection Time: 05/04/21 10:00 AM   Result Value Ref Range    Vitamin B-12 490 232 - 1,245 pg/mL   Vitamin D 25 hydroxy    Collection Time: 05/04/21 10:00 AM   Result Value Ref Range    25-HYDROXY VIT D 19 0 (L) 30 0 - 100 0 ng/mL

## 2021-05-12 LAB
CYTOLOGIST CVX/VAG CYTO: NORMAL
DX ICD CODE: NORMAL
OTHER STN SPEC: NORMAL
PATH REPORT.FINAL DX SPEC: NORMAL
SL AMB NOTE:: NORMAL
SL AMB SPECIMEN ADEQUACY: NORMAL

## 2022-08-23 ENCOUNTER — OFFICE VISIT (OUTPATIENT)
Dept: URGENT CARE | Facility: CLINIC | Age: 42
End: 2022-08-23
Payer: COMMERCIAL

## 2022-08-23 VITALS
OXYGEN SATURATION: 100 % | TEMPERATURE: 101.6 F | HEART RATE: 100 BPM | RESPIRATION RATE: 18 BRPM | SYSTOLIC BLOOD PRESSURE: 110 MMHG | HEIGHT: 63 IN | BODY MASS INDEX: 24.06 KG/M2 | WEIGHT: 135.8 LBS | DIASTOLIC BLOOD PRESSURE: 64 MMHG

## 2022-08-23 DIAGNOSIS — J02.9 ACUTE PHARYNGITIS, UNSPECIFIED ETIOLOGY: Primary | ICD-10-CM

## 2022-08-23 LAB
S PYO AG THROAT QL: NEGATIVE
SARS-COV-2 AG UPPER RESP QL IA: NEGATIVE
VALID CONTROL: NORMAL

## 2022-08-23 PROCEDURE — 99213 OFFICE O/P EST LOW 20 MIN: CPT | Performed by: FAMILY MEDICINE

## 2022-08-23 PROCEDURE — 87880 STREP A ASSAY W/OPTIC: CPT | Performed by: FAMILY MEDICINE

## 2022-08-23 PROCEDURE — 87811 SARS-COV-2 COVID19 W/OPTIC: CPT | Performed by: FAMILY MEDICINE

## 2022-08-23 NOTE — PATIENT INSTRUCTIONS
1  Acute Pharyngitis  - rapid strep test in office is negative, throat swab sent for culture testing, patient has been instructed to call the office in 2 days to follow up culture results, if positive for strep, plan is to treat with Zithromax    - rapid COVID-19 test performed in office is negative   - take Tylenol or Motrin as needed   - drink plenty of fluids   - advised warm salt water gargles and throat lozenges as needed    - drink warm tea w/ lemon and honey   - may use Chloraseptic throat spray as needed   - follow up w/ PCP for re-check in 3-5 days  - if symptoms persist despite treatment, worsen, or any new symptoms present, patient is to be seen in the ER

## 2022-08-23 NOTE — PROGRESS NOTES
[facility]        NAME: Leroy Connor is a 43 y o  female  : 1980    MRN: [id number]  DATE: 2022  TIME: 10:46 AM    Assessment and Plan   Acute pharyngitis, unspecified etiology [J02 9]  1  Acute pharyngitis, unspecified etiology  POCT rapid strepA    Poct Covid 19 Rapid Antigen Test    Throat culture         Patient Instructions     Patient Instructions   1  Acute Pharyngitis  - rapid strep test in office is negative, throat swab sent for culture testing, patient has been instructed to call the office in 2 days to follow up culture results, if positive for strep, plan is to treat with Zithromax    - rapid COVID-19 test performed in office is negative   - take Tylenol or Motrin as needed   - drink plenty of fluids   - advised warm salt water gargles and throat lozenges as needed    - drink warm tea w/ lemon and honey   - may use Chloraseptic throat spray as needed   - follow up w/ PCP for re-check in 3-5 days  - if symptoms persist despite treatment, worsen, or any new symptoms present, patient is to be seen in the ER  Follow up with PCP in 3-5 days  Proceed to  ER if symptoms worsen  Chief Complaint     Chief Complaint   Patient presents with    Sore Throat     X 2 days, had a fever at home of 101 1         History of Present Illness       42 yo female presents c/o sore throat x 2 days  Patient has been febrile at home with a fever of 101 1, and currently has a fever of 101 6  She has also felt some chills and body aches  No chest pain, SOB, or wheezing  Non-smoker  No GI sx  No skin rashes  No loss of taste or smell  No neck pain or swelling  No feelings of throat closing or difficulty swallowing  No drooling or muffled voice  No recent travel or known exposure to anyone with COVID-19  Patient has received the COVID-19 vaccine  Rapid strep test performed in office is negative  Rapid COVID-19 test performed in office is negative         Review of Systems   Review of Systems Constitutional: Positive for fever  As noted in HPI   HENT: Positive for sore throat  As noted in HPI   Eyes: Negative  Respiratory: Negative  Cardiovascular: Negative  Gastrointestinal: Negative  Musculoskeletal: Positive for myalgias  Skin: Negative  Allergic/Immunologic: Negative  Neurological: Negative  Hematological: Negative  Current Medications       Current Outpatient Medications:     Ascorbic Acid (VITAMIN C PO), Take by mouth daily, Disp: , Rfl:     Cholecalciferol (VITAMIN D3 PO), Take 1,000 mg by mouth daily, Disp: , Rfl:     vitamin B-12 (VITAMIN B-12) 500 mcg tablet, Take by mouth, Disp: , Rfl:     Current Allergies     Allergies as of 08/23/2022 - Reviewed 08/23/2022   Allergen Reaction Noted    Penicillins Hives and Rash 04/15/2016            The following portions of the patient's history were reviewed and updated as appropriate: allergies, current medications, past family history, past medical history, past social history, past surgical history and problem list      Past Medical History:   Diagnosis Date    Patient denies medical problems        Past Surgical History:   Procedure Laterality Date    NO PAST SURGERIES         Family History   Problem Relation Age of Onset    No Known Problems Mother     Diabetes Father     Hypertension Father     Hyperlipidemia Father     Substance Abuse Neg Hx     Mental illness Neg Hx          Medications have been verified  Objective   /64   Pulse 100   Temp (!) 101 6 °F (38 7 °C) (Tympanic)   Resp 18   Ht 5' 3" (1 6 m)   Wt 61 6 kg (135 lb 12 8 oz)   SpO2 100%   BMI 24 06 kg/m²   No LMP recorded  Physical Exam     Physical Exam  Vitals and nursing note reviewed  Constitutional:       General: She is awake  She is not in acute distress  Appearance: Normal appearance  She is well-developed and well-groomed  She is not ill-appearing, toxic-appearing or diaphoretic     HENT: Head: Normocephalic and atraumatic  Right Ear: Tympanic membrane, ear canal and external ear normal       Left Ear: Tympanic membrane, ear canal and external ear normal       Nose: Nose normal       Mouth/Throat:      Lips: Pink  Mouth: Mucous membranes are moist       Pharynx: Uvula midline  Posterior oropharyngeal erythema present  No pharyngeal swelling, oropharyngeal exudate or uvula swelling  Tonsils: No tonsillar exudate or tonsillar abscesses  Comments: Airway fully patent  Eyes:      Conjunctiva/sclera: Conjunctivae normal    Neck:      Trachea: Trachea and phonation normal    Cardiovascular:      Rate and Rhythm: Normal rate  Pulses: Normal pulses  Pulmonary:      Effort: Pulmonary effort is normal  No tachypnea, accessory muscle usage or respiratory distress  Musculoskeletal:      Cervical back: Neck supple  No edema, erythema, rigidity or tenderness  Lymphadenopathy:      Cervical: No cervical adenopathy  Skin:     General: Skin is warm and dry  Capillary Refill: Capillary refill takes less than 2 seconds  Coloration: Skin is not pale  Neurological:      Mental Status: She is alert and oriented to person, place, and time  Mental status is at baseline  Psychiatric:         Attention and Perception: Attention and perception normal          Mood and Affect: Mood and affect normal          Speech: Speech normal          Behavior: Behavior normal  Behavior is cooperative  Thought Content:  Thought content normal          Cognition and Memory: Cognition and memory normal          Judgment: Judgment normal

## 2022-08-24 ENCOUNTER — TELEPHONE (OUTPATIENT)
Dept: URGENT CARE | Facility: CLINIC | Age: 42
End: 2022-08-24

## 2022-08-24 DIAGNOSIS — J02.9 ACUTE PHARYNGITIS, UNSPECIFIED ETIOLOGY: Primary | ICD-10-CM

## 2022-08-24 RX ORDER — CLINDAMYCIN HYDROCHLORIDE 300 MG/1
300 CAPSULE ORAL EVERY 8 HOURS
Qty: 30 CAPSULE | Refills: 0 | Status: SHIPPED | OUTPATIENT
Start: 2022-08-24 | End: 2022-09-03

## 2022-08-24 NOTE — TELEPHONE ENCOUNTER
Patient called the office on 08/24/22 to follow up throat culture results which are still pending  Patient states she continues to feel a sore throat and has a fever of 101 3 today  No feeling of throat closing or difficulty swallowing, no drooling, she is able to eat and drink without difficulty  Clindamycin x 10 days prescribed at this time, patient has been instructed to start the medication today and call the office tomorrow to follow up throat culture results  If throat culture is positive, plan is continue and complete the Clindamycin as prescribed  If patient's symptoms persist despite treatment, worsen, or any new symptoms present, patient is to be seen in the ER  If throat culture is negative, plan is for patient to be seen in the ER  Patient verbalizes understanding and agrees with the plan

## 2022-08-27 LAB — B-HEM STREP SPEC QL CULT: NEGATIVE

## 2022-10-12 PROBLEM — Z13.220 SCREENING FOR HYPERLIPIDEMIA: Status: RESOLVED | Noted: 2021-05-10 | Resolved: 2022-10-12

## 2022-10-12 PROBLEM — Z00.00 ROUTINE GENERAL MEDICAL EXAMINATION AT A HEALTH CARE FACILITY: Status: RESOLVED | Noted: 2021-05-10 | Resolved: 2022-10-12

## 2022-10-12 PROBLEM — Z12.11 SCREENING FOR COLON CANCER: Status: RESOLVED | Noted: 2021-05-10 | Resolved: 2022-10-12

## 2022-10-12 PROBLEM — Z13.6 SCREENING FOR HYPERTENSION: Status: RESOLVED | Noted: 2021-05-10 | Resolved: 2022-10-12

## 2022-10-12 PROBLEM — Z13.29 SCREENING FOR HYPOTHYROIDISM: Status: RESOLVED | Noted: 2021-05-10 | Resolved: 2022-10-12

## 2023-11-24 ENCOUNTER — OFFICE VISIT (OUTPATIENT)
Dept: URGENT CARE | Facility: CLINIC | Age: 43
End: 2023-11-24

## 2023-11-24 VITALS
HEIGHT: 63 IN | BODY MASS INDEX: 24.8 KG/M2 | TEMPERATURE: 98 F | WEIGHT: 140 LBS | RESPIRATION RATE: 20 BRPM | OXYGEN SATURATION: 98 % | HEART RATE: 80 BPM

## 2023-11-24 DIAGNOSIS — L50.9 HIVES: Primary | ICD-10-CM

## 2023-11-24 RX ORDER — METHYLPREDNISOLONE SODIUM SUCCINATE 40 MG/ML
40 INJECTION, POWDER, LYOPHILIZED, FOR SOLUTION INTRAMUSCULAR; INTRAVENOUS ONCE
Status: COMPLETED | OUTPATIENT
Start: 2023-11-24 | End: 2023-11-24

## 2023-11-24 RX ORDER — METHYLPREDNISOLONE 4 MG/1
TABLET ORAL
Qty: 1 EACH | Refills: 0 | Status: SHIPPED | OUTPATIENT
Start: 2023-11-24

## 2023-11-24 RX ORDER — DIPHENHYDRAMINE HCL 25 MG
50 TABLET ORAL ONCE
Status: DISCONTINUED | OUTPATIENT
Start: 2023-11-24 | End: 2023-11-24

## 2023-11-24 RX ADMIN — METHYLPREDNISOLONE SODIUM SUCCINATE 40 MG: 40 INJECTION, POWDER, LYOPHILIZED, FOR SOLUTION INTRAMUSCULAR; INTRAVENOUS at 15:28

## 2023-11-24 NOTE — PATIENT INSTRUCTIONS
Urticaria   WHAT YOU NEED TO KNOW:   Urticaria is also called hives. Hives can change size and shape, and appear anywhere on your skin. They can be mild or severe and last from a few minutes to a few days. Hives may be a sign of a severe allergic reaction called anaphylaxis that needs immediate treatment. Urticaria that lasts longer than 6 weeks may be a chronic condition that needs long-term treatment. DISCHARGE INSTRUCTIONS:   Call your local emergency number (911 in the [address]) for signs or symptoms of anaphylaxis,  such as trouble breathing, swelling in your mouth or throat, or wheezing. You may also have itching, a rash, or feel like you are going to faint. Return to the emergency department if:   Your heart is beating faster than it normally does. You have cramping or severe pain in your abdomen. Call your doctor if:   You have a fever. Your skin still itches 24 hours after you take your medicine. You still have hives after 7 days. Your joints are painful and swollen. You have questions or concerns about your condition or care. Medicines: You may need any of the following:  Epinephrine  is used to treat severe allergic reactions such as anaphylaxis. Antihistamines  decrease mild symptoms such as itching or a rash. Steroids  decrease redness, pain, and swelling. Take your medicine as directed. Contact your healthcare provider if you think your medicine is not helping or if you have side effects. Tell your provider if you are allergic to any medicine. Keep a list of the medicines, vitamins, and herbs you take. Include the amounts, and when and why you take them. Bring the list or the pill bottles to follow-up visits. Carry your medicine list with you in case of an emergency. Steps to take for signs or symptoms of anaphylaxis:   Immediately  give 1 shot of epinephrine only into the outer thigh muscle. Leave the shot in place  as directed.  Your provider may recommend you leave it in place for up to 10 seconds before you remove it. This helps make sure all of the epinephrine is delivered. Call 911 and go to the emergency department,  even if the shot improved symptoms. Do not drive yourself. Bring the used epinephrine shot with you. Safety precautions to take if you are at risk for anaphylaxis:   Keep 2 shots of epinephrine with you at all times. You may need a second shot, because epinephrine only works for about 20 minutes and symptoms may return. Your provider can show you and family members how to give the shot. Check the expiration date every month and replace it before it expires. Create an action plan. Your provider can help you create a written plan that explains the allergy and an emergency plan to treat a reaction. The plan explains when to give a second epinephrine shot if symptoms return or do not improve after the first. Give copies of the action plan and emergency instructions to family members, work and school staff, and  providers. Show them how to give a shot of epinephrine. Be careful when you exercise. If you have had exercise-induced anaphylaxis, do not exercise right after you eat. Stop exercising right away if you start to develop any signs or symptoms of anaphylaxis. You may first feel tired, warm, or have itchy skin. Hives, swelling, and severe breathing problems may develop if you continue to exercise. Carry medical alert identification. Wear medical alert jewelry or carry a card that explains the allergy. Ask your provider where to get these items. Keep a record of triggers and symptoms. Record everything you eat, drink, or apply to your skin for 3 weeks. Include stressful events and what you were doing right before your hives started. Bring the record with you to follow-up visits with your provider. Manage urticaria:   Cool your skin. This may help decrease itching. Apply a cool pack to your hives.  Dip a hand towel in cool water, wring it out, and place it on your hives. You may also soak your skin in a cool oatmeal bath. Do not rub your hives. This can irritate your skin and cause more hives. Wear loose clothing. Tight clothes may irritate your skin and cause more hives. Manage stress. Stress may trigger hives, or make them worse. Learn new ways to relax, such as deep breathing. Follow up with your healthcare provider as directed:  Write down your questions so you remember to ask them during your visits. © Copyright Matheus Navarrete 2023 Information is for End User's use only and may not be sold, redistributed or otherwise used for commercial purposes. The above information is an  only. It is not intended as medical advice for individual conditions or treatments. Talk to your doctor, nurse or pharmacist before following any medical regimen to see if it is safe and effective for you. Allergic reaction  -The patient appears stable today on exam. No facial swelling, tongue swelling, wheezing, stridor, work of breathing. Will manage outpatient unless she has worsening symptoms. Solumedrol 40mg IM today given in the office   -Continue your benadryl as needed every 6-8 hours  -Will place the patient on a Medrol Dose Ian as prescribed and discussed. Take with meals.  -Stay well hydrated and cool  -We discussed that she can take one tablet of famotidine 20mg today  -She already takes a daily Zyrtec. Do not take the zyrtec and benadryl close together as this can cause dizziness  -Avoid any known triggers  -Follow up with allergist as discussed. Red flag signs discussed in depth.  If you experience stridor, wheezing, dyspnea, increased work of breathing, persistent cough, cyanosis, vomiting, abdominal pain, hypotension, palpitations, chest pain go to the ED immediately

## 2023-11-24 NOTE — PROGRESS NOTES
[facility]        NAME: Pam Newman is a 37 y.o. female  : 1980    MRN: [id number]  DATE: 2023  TIME: 3:12 PM    Assessment and Plan   Hives [L50.9]  1. Hives  methylPREDNISolone sodium succinate (Solu-MEDROL) injection 40 mg    methylPREDNISolone 4 MG tablet therapy pack    DISCONTINUED: diphenhydrAMINE (BENADRYL) tablet 50 mg            Patient Instructions   Allergic reaction  -The patient appears stable today on exam. No facial swelling, tongue swelling, wheezing, stridor, work of breathing. Will manage outpatient unless she has worsening symptoms. Solumedrol 40mg IM today given in the office   -Continue your benadryl as needed every 6-8 hours  -Will place the patient on a Medrol Dose Ian as prescribed and discussed. Take with meals.  -Stay well hydrated and cool  -We discussed that she can take one tablet of famotidine 20mg today  -She already takes a daily Zyrtec. Do not take the zyrtec and benadryl close together as this can cause dizziness  -Avoid any known triggers  -Follow up with allergist as discussed. Red flag signs discussed in depth. If you experience stridor, wheezing, dyspnea, increased work of breathing, persistent cough, cyanosis, vomiting, abdominal pain, hypotension, palpitations, chest pain go to the ED immediately      Follow up with PCP in 3-5 days. Proceed to  ER if symptoms worsen. Chief Complaint     Chief Complaint   Patient presents with    Rash     Rash began yesterday full body and today erupted on face         History of Present Illness       The patient is a 45-year-old female who presents today for a full body rash that began last night. She states that after eating Thanksgiving dinner last night she began to experience rash that began over the entire body and today spread to the face. She believes that she is having an allergic reaction to polish mineral water. She has no known allergies besides amoxicillin.  She states that the rash is highly pruritic. She has no wheezing, stridor, shortness of breath, trouble swallowing. No swelling in the facial area or lip/tongue swelling. No dizziness, weakness. No cp, palpitations. Review of Systems   Review of Systems   Constitutional:  Negative for activity change, appetite change, chills, diaphoresis, fatigue and fever. HENT:  Negative for congestion, ear discharge, ear pain, facial swelling, hearing loss, postnasal drip, rhinorrhea, sinus pressure, sinus pain, sore throat, tinnitus, trouble swallowing and voice change. Eyes:  Negative for visual disturbance. Respiratory:  Negative for apnea, cough, chest tightness, shortness of breath, wheezing and stridor. Cardiovascular:  Negative for chest pain, palpitations and leg swelling. Gastrointestinal:  Negative for abdominal distention and abdominal pain. Genitourinary:  Negative for decreased urine volume. Musculoskeletal:  Negative for arthralgias, joint swelling, myalgias, neck pain and neck stiffness. Skin:  Positive for rash. Allergic/Immunologic: Negative for immunocompromised state. Neurological:  Negative for dizziness, weakness, light-headedness, numbness and headaches. Hematological:  Negative for adenopathy.          Current Medications       Current Outpatient Medications:     Ascorbic Acid (VITAMIN C PO), Take by mouth daily, Disp: , Rfl:     Cholecalciferol (VITAMIN D3 PO), Take 1,000 mg by mouth daily, Disp: , Rfl:     methylPREDNISolone 4 MG tablet therapy pack, Use as directed on package, Disp: 1 each, Rfl: 0    vitamin B-12 (VITAMIN B-12) 500 mcg tablet, Take by mouth, Disp: , Rfl:     Current Facility-Administered Medications:     methylPREDNISolone sodium succinate (Solu-MEDROL) injection 40 mg, 40 mg, Intramuscular, Once, CARLYLE García    Current Allergies     Allergies as of 11/24/2023 - Reviewed 11/24/2023   Allergen Reaction Noted    Amoxicillin Rash 11/24/2023    Penicillins Hives and Rash 04/15/2016            The following portions of the patient's history were reviewed and updated as appropriate: allergies, current medications, past family history, past medical history, past social history, past surgical history and problem list.     Past Medical History:   Diagnosis Date    Patient denies medical problems        Past Surgical History:   Procedure Laterality Date    NO PAST SURGERIES         Family History   Problem Relation Age of Onset    No Known Problems Mother     Diabetes Father     Hypertension Father     Hyperlipidemia Father     Substance Abuse Neg Hx     Mental illness Neg Hx          Medications have been verified. Objective   Pulse 80   Temp 98 °F (36.7 °C)   Resp 20   Ht 5' 3" (1.6 m)   Wt 63.5 kg (140 lb)   LMP 10/06/2023   SpO2 98%   BMI 24.80 kg/m²   Patient's last menstrual period was 10/06/2023. Physical Exam     Physical Exam  Vitals and nursing note reviewed. Constitutional:       General: She is not in acute distress. Appearance: She is well-developed. She is not ill-appearing, toxic-appearing or diaphoretic. HENT:      Head: Normocephalic and atraumatic. Mouth/Throat:      Lips: Pink. No lesions. Mouth: Mucous membranes are moist.      Pharynx: Uvula midline. No pharyngeal swelling. Tonsils: No tonsillar exudate. 1+ on the right. 1+ on the left. Comments: Airway is patent, no tongue swelling, no stridor or wheezing   Cardiovascular:      Rate and Rhythm: Normal rate and regular rhythm. Pulses: Normal pulses. Heart sounds: Normal heart sounds, S1 normal and S2 normal. No murmur heard. Pulmonary:      Effort: Pulmonary effort is normal. No tachypnea, accessory muscle usage or respiratory distress. Breath sounds: Normal breath sounds and air entry. No stridor, decreased air movement or transmitted upper airway sounds. No decreased breath sounds, wheezing, rhonchi or rales.    Skin:     Capillary Refill: Capillary refill takes less than 2 seconds. Findings: Rash present. Rash is urticarial.      Comments: Diffuse hives seen on exam today that extends from the facial area down to the ankles.

## 2025-04-24 ENCOUNTER — OFFICE VISIT (OUTPATIENT)
Dept: URGENT CARE | Facility: CLINIC | Age: 45
End: 2025-04-24
Payer: COMMERCIAL

## 2025-04-24 VITALS
RESPIRATION RATE: 17 BRPM | WEIGHT: 136.2 LBS | TEMPERATURE: 99.1 F | BODY MASS INDEX: 24.13 KG/M2 | HEART RATE: 83 BPM | OXYGEN SATURATION: 98 % | DIASTOLIC BLOOD PRESSURE: 72 MMHG | SYSTOLIC BLOOD PRESSURE: 118 MMHG

## 2025-04-24 DIAGNOSIS — J02.0 STREP PHARYNGITIS: Primary | ICD-10-CM

## 2025-04-24 DIAGNOSIS — J02.9 SORE THROAT: ICD-10-CM

## 2025-04-24 LAB — S PYO AG THROAT QL: POSITIVE

## 2025-04-24 PROCEDURE — 99213 OFFICE O/P EST LOW 20 MIN: CPT | Performed by: PHYSICIAN ASSISTANT

## 2025-04-24 PROCEDURE — 87880 STREP A ASSAY W/OPTIC: CPT | Performed by: PHYSICIAN ASSISTANT

## 2025-04-24 RX ORDER — PREDNISONE 10 MG/1
20 TABLET ORAL DAILY
Qty: 6 TABLET | Refills: 0 | Status: SHIPPED | OUTPATIENT
Start: 2025-04-24 | End: 2025-04-27

## 2025-04-24 RX ORDER — AZITHROMYCIN 250 MG/1
TABLET, FILM COATED ORAL
Qty: 6 TABLET | Refills: 0 | Status: SHIPPED | OUTPATIENT
Start: 2025-04-24 | End: 2025-04-28

## 2025-04-24 NOTE — PATIENT INSTRUCTIONS
Acute Pharyngitis:   -Rapid strep is positive   -Will send in Zithromax. Take with food and a probiotic.   -Warm salt water gargles and tea with honey may be soothing  -Stay very well hydrated and rested  -Advil or Tylenol for pain or fever  -Eat soft foods   -Cepacol throat lozenges for the pain   -Honey lozenges for the hoarseness  -Change your toothbrush 24 hours after beginning your antibiotic   -Run a humidifier by your bed. Take steam showers.   -If your sx worsen see your PCP immediately. Red flag signs and ED precautions discussed.

## 2025-04-24 NOTE — PROGRESS NOTES
[facility]        NAME: Kelly Segura is a 45 y.o. female  : 1980    MRN: [id number]  DATE: 2025  TIME: 10:03 AM    Assessment and Plan   Strep pharyngitis [J02.0]  1. Strep pharyngitis  azithromycin (ZITHROMAX) 250 mg tablet    predniSONE 10 mg tablet      2. Sore throat  POCT rapid strepA            Patient Instructions   Acute Pharyngitis:   -Rapid strep is positive   -Will send in Zithromax. Take with food and a probiotic.   -Warm salt water gargles and tea with honey may be soothing  -Stay very well hydrated and rested  -Advil or Tylenol for pain or fever  -Eat soft foods   -Cepacol throat lozenges for the pain   -Honey lozenges for the hoarseness  -Change your toothbrush 24 hours after beginning your antibiotic   -Run a humidifier by your bed. Take steam showers.   -If your sx worsen see your PCP immediately. Red flag signs and ED precautions discussed.         Follow up with PCP in 3-5 days.  Proceed to  ER if symptoms worsen.    If tests have been performed at [facility], our office will contact you with results if changes need to be made to the care plan discussed with you at the visit.  [facility].    Chief Complaint     Chief Complaint   Patient presents with    Cold Like Symptoms     Son tested for strep Monday, he is on ABX. Pt started to feel sick yesterday 25 with body aches chills, headache, back pain, tactile fever, sore throat minimal. Feels like her throat is swollen and painful. Sick two to three years ago. Needed steroids and ABX at that time. She is eating and drinking. Urinating normal. Pt is nauseous. No diarrhea or vomiting.          History of Present Illness       Kelly presents today for a one day hx of myalgias, chills, headache, nausea, tactile fever, sore throat. She states that her son is ill with strep pharyngitis. She states that she has painful swallowing.  No dyspnea, wheezing, chest tightness, cp,  palpitations. No dizziness or weakness. No vomiting, diarrhea, constipation, abdominal pain.   No stridor or drooling. No rash. No sweats or diaphoresis. No muffled voice.  Good PO intake. No loss of taste or smell. No lower extremity edema. No hx of asthma or smoking. No OTC measures attempted.               Review of Systems   Review of Systems   Constitutional:  Positive for fatigue and fever. Negative for activity change, appetite change, chills and diaphoresis.   HENT:  Positive for congestion and sore throat. Negative for ear discharge, ear pain, facial swelling, hearing loss, postnasal drip, rhinorrhea, sinus pressure, sinus pain, tinnitus, trouble swallowing and voice change.    Eyes:  Negative for visual disturbance.   Respiratory:  Negative for apnea, cough, chest tightness, shortness of breath, wheezing and stridor.    Cardiovascular:  Negative for chest pain, palpitations and leg swelling.   Gastrointestinal:  Positive for nausea. Negative for abdominal distention, abdominal pain, diarrhea and vomiting.   Genitourinary:  Negative for decreased urine volume.   Musculoskeletal:  Positive for myalgias. Negative for arthralgias, joint swelling, neck pain and neck stiffness.   Skin:  Negative for rash.   Allergic/Immunologic: Negative for immunocompromised state.   Neurological:  Negative for dizziness, weakness, light-headedness, numbness and headaches.   Hematological:  Negative for adenopathy.         Current Medications       Current Outpatient Medications:     Ascorbic Acid (VITAMIN C PO), Take by mouth daily, Disp: , Rfl:     azithromycin (ZITHROMAX) 250 mg tablet, Take 2 tablets today then 1 tablet daily x 4 days, Disp: 6 tablet, Rfl: 0    Cholecalciferol (VITAMIN D3 PO), Take 1,000 mg by mouth daily, Disp: , Rfl:     predniSONE 10 mg tablet, Take 2 tablets (20 mg total) by mouth daily for 3 days, Disp: 6 tablet, Rfl: 0    vitamin B-12 (VITAMIN B-12) 500 mcg tablet, Take by mouth, Disp: , Rfl:      methylPREDNISolone 4 MG tablet therapy pack, Use as directed on package (Patient not taking: Reported on 4/24/2025), Disp: 1 each, Rfl: 0    Current Allergies     Allergies as of 04/24/2025 - Reviewed 04/24/2025   Allergen Reaction Noted    Amoxicillin Rash 11/24/2023    Penicillins Hives and Rash 04/15/2016            The following portions of the patient's history were reviewed and updated as appropriate: allergies, current medications, past family history, past medical history, past social history, past surgical history and problem list.     Past Medical History:   Diagnosis Date    Patient denies medical problems        Past Surgical History:   Procedure Laterality Date    NO PAST SURGERIES         Family History   Problem Relation Age of Onset    No Known Problems Mother     Diabetes Father     Hypertension Father     Hyperlipidemia Father     Substance Abuse Neg Hx     Mental illness Neg Hx          Medications have been verified.        Objective   /72 (BP Location: Right arm, Patient Position: Sitting)   Pulse 83   Temp 99.1 °F (37.3 °C) (Temporal)   Resp 17   Wt 61.8 kg (136 lb 3.2 oz)   SpO2 98%   BMI 24.13 kg/m²   No LMP recorded.       Physical Exam     Physical Exam  Vitals and nursing note reviewed.   Constitutional:       General: She is not in acute distress.     Appearance: She is well-developed. She is not diaphoretic.   HENT:      Head: Normocephalic and atraumatic.      Right Ear: Hearing, tympanic membrane, ear canal and external ear normal.      Left Ear: Hearing, tympanic membrane, ear canal and external ear normal.      Nose: Nose normal. No mucosal edema, congestion or rhinorrhea.      Right Sinus: No maxillary sinus tenderness or frontal sinus tenderness.      Left Sinus: No maxillary sinus tenderness or frontal sinus tenderness.      Mouth/Throat:      Lips: Pink. No lesions.      Mouth: Mucous membranes are moist.      Pharynx: Uvula midline. Pharyngeal swelling and posterior  oropharyngeal erythema present. No oropharyngeal exudate, uvula swelling or postnasal drip.      Tonsils: No tonsillar exudate or tonsillar abscesses. 2+ on the right. 2+ on the left.   Cardiovascular:      Rate and Rhythm: Normal rate and regular rhythm.      Heart sounds: S1 normal and S2 normal. Heart sounds not distant. No murmur heard.     No friction rub. No gallop. No S3 or S4 sounds.   Pulmonary:      Effort: No tachypnea, bradypnea, accessory muscle usage or respiratory distress.      Breath sounds: No decreased breath sounds, wheezing, rhonchi or rales.   Musculoskeletal:      Cervical back: Full passive range of motion without pain, normal range of motion and neck supple.   Lymphadenopathy:      Cervical: Cervical adenopathy present.   Neurological:      Mental Status: She is alert and oriented to person, place, and time.   Psychiatric:         Behavior: Behavior normal.

## 2025-05-15 ENCOUNTER — OFFICE VISIT (OUTPATIENT)
Dept: FAMILY MEDICINE CLINIC | Facility: CLINIC | Age: 45
End: 2025-05-15
Payer: COMMERCIAL

## 2025-05-15 VITALS
HEART RATE: 70 BPM | TEMPERATURE: 97.3 F | RESPIRATION RATE: 14 BRPM | OXYGEN SATURATION: 98 % | DIASTOLIC BLOOD PRESSURE: 70 MMHG | SYSTOLIC BLOOD PRESSURE: 110 MMHG | BODY MASS INDEX: 24.27 KG/M2 | WEIGHT: 137 LBS | HEIGHT: 63 IN

## 2025-05-15 DIAGNOSIS — Z13.6 SCREENING FOR HYPERTENSION: ICD-10-CM

## 2025-05-15 DIAGNOSIS — Z92.29 HISTORY OF BCG VACCINATION: ICD-10-CM

## 2025-05-15 DIAGNOSIS — R76.11 POSITIVE PPD: ICD-10-CM

## 2025-05-15 DIAGNOSIS — Z13.220 SCREENING FOR HYPERLIPIDEMIA: ICD-10-CM

## 2025-05-15 DIAGNOSIS — Z13.29 SCREENING FOR HYPOTHYROIDISM: ICD-10-CM

## 2025-05-15 DIAGNOSIS — Z12.31 ENCOUNTER FOR SCREENING MAMMOGRAM FOR BREAST CANCER: ICD-10-CM

## 2025-05-15 DIAGNOSIS — Z76.89 ESTABLISHING CARE WITH NEW DOCTOR, ENCOUNTER FOR: Primary | ICD-10-CM

## 2025-05-15 DIAGNOSIS — Z11.1 TUBERCULOSIS SCREENING: ICD-10-CM

## 2025-05-15 PROCEDURE — 99204 OFFICE O/P NEW MOD 45 MIN: CPT | Performed by: FAMILY MEDICINE

## 2025-05-15 NOTE — ASSESSMENT & PLAN NOTE
PATIENT RETURNS TO RE ESTABLISH CARE    REVIEWED PAST MEDICAL HISTORY    DISCUSSED HEALTH AND PRACTICE ISSUES    APPLYING FOR SCHOOL  HAS HAD BCG VACCINE - ALWAYS PPD - NEEDS CHEST X RAY    PATIENT FEELS WELL  NO SPECIFIC COMPLAINTS  Orders:  •  CBC and differential; Future  •  Comprehensive metabolic panel; Future  •  Lipid panel; Future  •  TSH, 3rd generation; Future

## 2025-05-15 NOTE — PROGRESS NOTES
Name: Kelly Jordaniurawiec      : 1980      MRN: [id number]  Encounter Provider: Herberth Knight MD  Encounter Date: 5/15/2025   Encounter department: [facility]    Assessment & Plan  Establishing care with new doctor, encounter for  PATIENT RETURNS TO RE ESTABLISH CARE    REVIEWED PAST MEDICAL HISTORY    DISCUSSED HEALTH AND PRACTICE ISSUES    APPLYING FOR SCHOOL  HAS HAD BCG VACCINE - ALWAYS PPD - NEEDS CHEST X RAY    PATIENT FEELS WELL  NO SPECIFIC COMPLAINTS  Orders:  •  CBC and differential; Future  •  Comprehensive metabolic panel; Future  •  Lipid panel; Future  •  TSH, 3rd generation; Future    Screening for hypertension    Orders:  •  Comprehensive metabolic panel; Future    Screening for hyperlipidemia    Orders:  •  Lipid panel; Future    Screening for hypothyroidism    Orders:  •  TSH, 3rd generation; Future    Encounter for screening mammogram for breast cancer    Orders:  •  Mammo screening bilateral w 3d and cad; Future  •  Mammo screening bilateral w 3d and cad; Future    Tuberculosis screening    Orders:  •  XR chest pa and lateral; Future    Positive PPD    Orders:  •  XR chest pa and lateral; Future    History of BCG vaccination    Orders:  •  XR chest pa and lateral; Future      Depression Screening and Follow-up Plan: Patient was screened for depression during today's encounter. They screened negative with a PHQ-2 score of 0.          History of Present Illness     PATIENT RETURNS TO RE ESTABLISH CARE      Review of Systems   Constitutional:  Negative for chills, fatigue and fever.   HENT:  Negative for congestion, ear discharge, ear pain, mouth sores, postnasal drip, sore throat and trouble swallowing.    Eyes:  Negative for pain, discharge and visual disturbance.   Respiratory:  Negative for cough, shortness of breath and wheezing.    Cardiovascular:  Negative for chest pain, palpitations and leg swelling.   Gastrointestinal:  Negative for abdominal  "distention, abdominal pain, blood in stool, diarrhea and nausea.   Endocrine: Negative for polydipsia, polyphagia and polyuria.   Genitourinary:  Negative for dysuria, frequency, hematuria and urgency.   Musculoskeletal:  Negative for arthralgias, gait problem and joint swelling.   Skin:  Negative for pallor and rash.   Neurological:  Negative for dizziness, syncope, speech difficulty, weakness, light-headedness, numbness and headaches.   Hematological:  Negative for adenopathy.   Psychiatric/Behavioral:  Negative for behavioral problems, confusion and sleep disturbance. The patient is not nervous/anxious.      Past Medical History:   Diagnosis Date   • Patient denies medical problems      Past Surgical History:   Procedure Laterality Date   • NO PAST SURGERIES       Family History   Problem Relation Age of Onset   • No Known Problems Mother    • Diabetes Father    • Hypertension Father    • Hyperlipidemia Father    • Substance Abuse Neg Hx    • Mental illness Neg Hx      Social History     Tobacco Use   • Smoking status: Former     Current packs/day: 0.00     Average packs/day: 0.3 packs/day for 14.0 years (3.5 ttl pk-yrs)     Types: Cigarettes     Start date:      Quit date: 2016     Years since quittin.3     Passive exposure: Never   • Smokeless tobacco: Never   Vaping Use   • Vaping status: Never Used   Substance and Sexual Activity   • Alcohol use: Yes     Comment: SOCIALLY   • Drug use: Never   • Sexual activity: Not on file     Medications[1]  Allergies   Allergen Reactions   • Amoxicillin Rash   • Penicillins Hives and Rash     There is no immunization history for the selected administration types on file for this patient.  Objective   /70   Pulse 70   Temp (!) 97.3 °F (36.3 °C) (Temporal)   Resp 14   Ht 5' 2.5\" (1.588 m)   Wt 62.1 kg (137 lb)   LMP 08/15/2024 (Approximate)   SpO2 98%   BMI 24.66 kg/m²     Physical Exam  Constitutional:       General: She is not in acute distress.     " Appearance: Normal appearance. She is well-developed and normal weight. She is not ill-appearing.   HENT:      Head: Normocephalic and atraumatic.      Nose: Nose normal.      Mouth/Throat:      Mouth: Mucous membranes are moist.     Eyes:      General:         Right eye: No discharge.         Left eye: No discharge.      Conjunctiva/sclera: Conjunctivae normal.      Pupils: Pupils are equal, round, and reactive to light.     Neck:      Thyroid: No thyromegaly.     Cardiovascular:      Rate and Rhythm: Normal rate and regular rhythm.      Heart sounds: Normal heart sounds. No murmur heard.  Pulmonary:      Effort: Pulmonary effort is normal. No respiratory distress.      Breath sounds: Normal breath sounds. No wheezing or rales.   Abdominal:      General: Bowel sounds are normal.      Palpations: Abdomen is soft.      Tenderness: There is no abdominal tenderness.     Musculoskeletal:         General: No tenderness. Normal range of motion.      Cervical back: Normal range of motion and neck supple.   Lymphadenopathy:      Cervical: No cervical adenopathy.     Skin:     General: Skin is warm and dry.      Findings: No erythema or rash.     Neurological:      General: No focal deficit present.      Mental Status: She is alert and oriented to person, place, and time.     Psychiatric:         Behavior: Behavior normal.         Thought Content: Thought content normal.         Judgment: Judgment normal.                [1]  Current Outpatient Medications on File Prior to Visit   Medication Sig   • Ascorbic Acid (VITAMIN C PO) Take by mouth in the morning.   • Cholecalciferol (VITAMIN D3 PO) Take 1,000 mg by mouth in the morning.   • vitamin B-12 (VITAMIN B-12) 500 mcg tablet Take by mouth   • [DISCONTINUED] methylPREDNISolone 4 MG tablet therapy pack Use as directed on package (Patient not taking: Reported on 5/15/2025)

## 2025-05-15 NOTE — ASSESSMENT & PLAN NOTE
Orders:  •  Mammo screening bilateral w 3d and cad; Future  •  Mammo screening bilateral w 3d and cad; Future

## 2025-06-04 ENCOUNTER — TELEPHONE (OUTPATIENT)
Age: 45
End: 2025-06-04

## 2025-06-04 ENCOUNTER — APPOINTMENT (OUTPATIENT)
Dept: RADIOLOGY | Facility: CLINIC | Age: 45
End: 2025-06-04
Payer: COMMERCIAL

## 2025-06-04 DIAGNOSIS — Z92.29 HISTORY OF BCG VACCINATION: ICD-10-CM

## 2025-06-04 DIAGNOSIS — R76.11 POSITIVE PPD: ICD-10-CM

## 2025-06-04 DIAGNOSIS — Z11.1 TUBERCULOSIS SCREENING: ICD-10-CM

## 2025-06-04 PROCEDURE — 71046 X-RAY EXAM CHEST 2 VIEWS: CPT

## 2025-06-04 NOTE — TELEPHONE ENCOUNTER
PT stated that she would need a letter stating that she had a chest XR, which was negative and is TB free. PT said to please upload letter to My Chart, and she will retreive it from there.She is also requesting a phone call to advise her that the letter is completed and uploaded to her.    Please advise    ThankYou

## 2025-06-05 ENCOUNTER — RESULTS FOLLOW-UP (OUTPATIENT)
Dept: FAMILY MEDICINE CLINIC | Facility: CLINIC | Age: 45
End: 2025-06-05

## 2025-06-05 NOTE — TELEPHONE ENCOUNTER
Dr. Knight did a hand written note.  Scanned it into [facility] and send Kelly a my chart message with it attached.